# Patient Record
Sex: FEMALE | Race: WHITE
[De-identification: names, ages, dates, MRNs, and addresses within clinical notes are randomized per-mention and may not be internally consistent; named-entity substitution may affect disease eponyms.]

---

## 2019-09-17 ENCOUNTER — HOSPITAL ENCOUNTER (OUTPATIENT)
Dept: HOSPITAL 53 - M MSPAV | Age: 74
Setting detail: OBSERVATION
LOS: 2 days | Discharge: HOME | End: 2019-09-19
Attending: INTERNAL MEDICINE | Admitting: INTERNAL MEDICINE
Payer: MEDICARE

## 2019-09-17 VITALS — DIASTOLIC BLOOD PRESSURE: 58 MMHG | SYSTOLIC BLOOD PRESSURE: 115 MMHG

## 2019-09-17 VITALS — DIASTOLIC BLOOD PRESSURE: 74 MMHG | SYSTOLIC BLOOD PRESSURE: 142 MMHG

## 2019-09-17 VITALS — HEIGHT: 63 IN | BODY MASS INDEX: 34.3 KG/M2 | WEIGHT: 193.57 LBS

## 2019-09-17 VITALS — DIASTOLIC BLOOD PRESSURE: 55 MMHG | SYSTOLIC BLOOD PRESSURE: 115 MMHG

## 2019-09-17 DIAGNOSIS — E03.9: ICD-10-CM

## 2019-09-17 DIAGNOSIS — R44.1: ICD-10-CM

## 2019-09-17 DIAGNOSIS — M54.5: ICD-10-CM

## 2019-09-17 DIAGNOSIS — Z91.81: ICD-10-CM

## 2019-09-17 DIAGNOSIS — R55: Primary | ICD-10-CM

## 2019-09-17 DIAGNOSIS — J96.02: ICD-10-CM

## 2019-09-17 DIAGNOSIS — D51.9: ICD-10-CM

## 2019-09-17 DIAGNOSIS — E78.49: ICD-10-CM

## 2019-09-17 DIAGNOSIS — E87.0: ICD-10-CM

## 2019-09-17 DIAGNOSIS — Z79.82: ICD-10-CM

## 2019-09-17 DIAGNOSIS — E86.0: ICD-10-CM

## 2019-09-17 DIAGNOSIS — N17.9: ICD-10-CM

## 2019-09-17 DIAGNOSIS — E11.9: ICD-10-CM

## 2019-09-17 DIAGNOSIS — Z79.899: ICD-10-CM

## 2019-09-17 DIAGNOSIS — I10: ICD-10-CM

## 2019-09-17 DIAGNOSIS — Z79.4: ICD-10-CM

## 2019-09-17 LAB
CK MB CFR.DF SERPL CALC: 2.17
CK MB SERPL-MCNC: 2 NG/ML (ref ?–3.6)
CK SERPL-CCNC: 92 U/L (ref 26–192)
PHOSPHATE SERPL-MCNC: 4.1 MG/DL (ref 2.5–4.9)
TROPONIN I SERPL-MCNC: < 0.02 NG/ML (ref ?–0.1)
URATE SERPL-MCNC: 7.3 MG/DL (ref 2.6–6)

## 2019-09-17 PROCEDURE — 82607 VITAMIN B-12: CPT

## 2019-09-17 PROCEDURE — 83735 ASSAY OF MAGNESIUM: CPT

## 2019-09-17 PROCEDURE — 85027 COMPLETE CBC AUTOMATED: CPT

## 2019-09-17 PROCEDURE — 96372 THER/PROPH/DIAG INJ SC/IM: CPT

## 2019-09-17 PROCEDURE — 90682 RIV4 VACC RECOMBINANT DNA IM: CPT

## 2019-09-17 PROCEDURE — 84100 ASSAY OF PHOSPHORUS: CPT

## 2019-09-17 PROCEDURE — 84145 PROCALCITONIN (PCT): CPT

## 2019-09-17 PROCEDURE — 93306 TTE W/DOPPLER COMPLETE: CPT

## 2019-09-17 PROCEDURE — 84550 ASSAY OF BLOOD/URIC ACID: CPT

## 2019-09-17 PROCEDURE — 36415 COLL VENOUS BLD VENIPUNCTURE: CPT

## 2019-09-17 PROCEDURE — 96360 HYDRATION IV INFUSION INIT: CPT

## 2019-09-17 PROCEDURE — 82803 BLOOD GASES ANY COMBINATION: CPT

## 2019-09-17 PROCEDURE — 90670 PCV13 VACCINE IM: CPT

## 2019-09-17 PROCEDURE — 97161 PT EVAL LOW COMPLEX 20 MIN: CPT

## 2019-09-17 PROCEDURE — 84484 ASSAY OF TROPONIN QUANT: CPT

## 2019-09-17 PROCEDURE — 82747 ASSAY OF FOLIC ACID RBC: CPT

## 2019-09-17 PROCEDURE — 82553 CREATINE MB FRACTION: CPT

## 2019-09-17 PROCEDURE — 93005 ELECTROCARDIOGRAM TRACING: CPT

## 2019-09-17 PROCEDURE — 94010 BREATHING CAPACITY TEST: CPT

## 2019-09-17 PROCEDURE — 96361 HYDRATE IV INFUSION ADD-ON: CPT

## 2019-09-17 PROCEDURE — 80048 BASIC METABOLIC PNL TOTAL CA: CPT

## 2019-09-17 PROCEDURE — 82550 ASSAY OF CK (CPK): CPT

## 2019-09-17 RX ADMIN — INSULIN LISPRO SCH UNITS: 100 INJECTION, SOLUTION INTRAVENOUS; SUBCUTANEOUS at 17:30

## 2019-09-17 RX ADMIN — SODIUM CHLORIDE SCH MLS/HR: 9 INJECTION, SOLUTION INTRAVENOUS at 16:00

## 2019-09-17 RX ADMIN — ASPIRIN SCH MG: 81 TABLET ORAL at 20:07

## 2019-09-17 RX ADMIN — SODIUM CHLORIDE SCH UNITS: 4.5 INJECTION, SOLUTION INTRAVENOUS at 20:08

## 2019-09-17 RX ADMIN — GABAPENTIN SCH MG: 400 CAPSULE ORAL at 20:07

## 2019-09-17 RX ADMIN — INSULIN LISPRO SCH UNITS: 100 INJECTION, SOLUTION INTRAVENOUS; SUBCUTANEOUS at 21:00

## 2019-09-18 VITALS — SYSTOLIC BLOOD PRESSURE: 136 MMHG | DIASTOLIC BLOOD PRESSURE: 66 MMHG

## 2019-09-18 VITALS — SYSTOLIC BLOOD PRESSURE: 149 MMHG | DIASTOLIC BLOOD PRESSURE: 76 MMHG

## 2019-09-18 VITALS — DIASTOLIC BLOOD PRESSURE: 62 MMHG | SYSTOLIC BLOOD PRESSURE: 133 MMHG

## 2019-09-18 VITALS — SYSTOLIC BLOOD PRESSURE: 137 MMHG | DIASTOLIC BLOOD PRESSURE: 63 MMHG

## 2019-09-18 VITALS — SYSTOLIC BLOOD PRESSURE: 133 MMHG | DIASTOLIC BLOOD PRESSURE: 78 MMHG

## 2019-09-18 VITALS — DIASTOLIC BLOOD PRESSURE: 72 MMHG | SYSTOLIC BLOOD PRESSURE: 135 MMHG

## 2019-09-18 LAB
BASE EXCESS BLDA CALC-SCNC: -0.7 MMOL/L (ref -2–2)
BUN SERPL-MCNC: 24 MG/DL (ref 7–18)
CALCIUM SERPL-MCNC: 8.4 MG/DL (ref 8.8–10.2)
CHLORIDE SERPL-SCNC: 116 MEQ/L (ref 98–107)
CO2 BLDA CALC-SCNC: 26 MEQ/L (ref 23–31)
CO2 SERPL-SCNC: 25 MEQ/L (ref 21–32)
CREAT SERPL-MCNC: 1.16 MG/DL (ref 0.55–1.3)
GFR SERPL CREATININE-BSD FRML MDRD: 48.9 ML/MIN/{1.73_M2} (ref 39–?)
GLUCOSE SERPL-MCNC: 67 MG/DL (ref 70–100)
HCO3 BLDA-SCNC: 24.7 MEQ/L (ref 22–26)
HCO3 STD BLDA-SCNC: 23.8 MEQ/L (ref 22–26)
HCT VFR BLD AUTO: 37.7 % (ref 36–47)
HCT VFR BLD AUTO: 37.9 % (ref 36–47)
HGB BLD-MCNC: 12.3 G/DL (ref 12–15.5)
MAGNESIUM SERPL-MCNC: 2 MG/DL (ref 1.8–2.4)
MCH RBC QN AUTO: 31 PG (ref 27–33)
MCHC RBC AUTO-ENTMCNC: 32.6 G/DL (ref 32–36.5)
MCV RBC AUTO: 95 FL (ref 80–96)
PCO2 BLDA: 43.5 MMHG (ref 35–45)
PH BLDA: 7.37 UNITS (ref 7.35–7.45)
PLATELET # BLD AUTO: 203 10^3/UL (ref 150–450)
PO2 BLDA: 65.3 MMHG (ref 75–100)
POTASSIUM SERPL-SCNC: 3.9 MEQ/L (ref 3.5–5.1)
RBC # BLD AUTO: 3.97 10^6/UL (ref 4–5.4)
SAO2 % BLDA: 92.9 % (ref 95–99)
SODIUM SERPL-SCNC: 147 MEQ/L (ref 136–145)
VIT B12 SERPL-MCNC: 214 PG/ML (ref 247–911)
WBC # BLD AUTO: 11 10^3/UL (ref 4–10)

## 2019-09-18 RX ADMIN — INSULIN LISPRO SCH UNITS: 100 INJECTION, SOLUTION INTRAVENOUS; SUBCUTANEOUS at 20:52

## 2019-09-18 RX ADMIN — GABAPENTIN SCH MG: 400 CAPSULE ORAL at 16:01

## 2019-09-18 RX ADMIN — GABAPENTIN SCH MG: 400 CAPSULE ORAL at 08:42

## 2019-09-18 RX ADMIN — DULOXETINE HYDROCHLORIDE SCH MG: 30 CAPSULE, DELAYED RELEASE ORAL at 08:42

## 2019-09-18 RX ADMIN — GABAPENTIN SCH MG: 400 CAPSULE ORAL at 20:41

## 2019-09-18 RX ADMIN — INSULIN LISPRO SCH UNITS: 100 INJECTION, SOLUTION INTRAVENOUS; SUBCUTANEOUS at 18:20

## 2019-09-18 RX ADMIN — INSULIN LISPRO SCH UNITS: 100 INJECTION, SOLUTION INTRAVENOUS; SUBCUTANEOUS at 07:30

## 2019-09-18 RX ADMIN — LEVOTHYROXINE SODIUM SCH MCG: 75 TABLET ORAL at 05:24

## 2019-09-18 RX ADMIN — SODIUM CHLORIDE SCH UNITS: 4.5 INJECTION, SOLUTION INTRAVENOUS at 20:42

## 2019-09-18 RX ADMIN — SODIUM CHLORIDE SCH UNITS: 4.5 INJECTION, SOLUTION INTRAVENOUS at 05:24

## 2019-09-18 RX ADMIN — ASPIRIN SCH MG: 81 TABLET ORAL at 20:42

## 2019-09-18 RX ADMIN — INSULIN LISPRO SCH UNITS: 100 INJECTION, SOLUTION INTRAVENOUS; SUBCUTANEOUS at 12:06

## 2019-09-18 RX ADMIN — SODIUM CHLORIDE SCH UNITS: 4.5 INJECTION, SOLUTION INTRAVENOUS at 13:53

## 2019-09-18 RX ADMIN — SODIUM CHLORIDE SCH MLS/HR: 9 INJECTION, SOLUTION INTRAVENOUS at 00:39

## 2019-09-18 NOTE — HPE
DATE OF ADMISSION:  2019

 

PRIMARY CARE PHYSICIAN: Dr. So out of [place], [place]

 

CHIEF COMPLAINT: Altered mental status as well as falls times two today.

 

HISTORY OF PRESENT ILLNESS: Lacey is a 74-year-old woman who has a history of

insulin-dependent diabetes mellitus, type 2, hyperlipidemia, chronic pain

syndrome, for which she is on Percocet. She presents as a transfer from [facility] Emergency Room after presenting there this morning after she suffered

two falls. Patient informs me that she had gotten up out of bed and then all of a

sudden fell backward into bed. She felt like she lost consciousness for a couple

seconds. When she regained consciousness she attempted to stand up again and fell

flat onto her head, striking her head. She subsequently went to the emergency

room, as she has been feeling quite fatigued and tired for further evaluation.

While there, patient was noted to be afebrile with stable vital signs. She was

noted to be hypercapnic on her blood gas with a pCO2 of 51.4 with a pH of 7.29,

oxygen saturation of 94%, and a pO2 of 81.She was placed on oxygen with

improvement in her mentation. Further workup, including labs, found that she had

a creatinine of 1.9 with a BUN of 43 and a potassium of 3.7. Her in initial

troponin as well as EKG and thyroid studies were negative. An aspirin,

salicylate, as well as urinalysis and urine drug screen were all negative. CT

scan of the head as well as chest x-ray were all unremarkable. The patient was

transferred to our facility, as they did not have any monitored beds.

 

ALLERGIES: SOMA as well as MACRODANTIN.

 

CURRENT MEDICATIONS:

- baby aspirin daily

- duloxetine 60 mg daily

- gabapentin 700 mg three times a day

- Norco 5/325 one tablet three times a day

- insulin detemir 60 units daily

- Synthroid 75 mcg daily

- omeprazole 40 mg daily

- tizanidine 2 mg at bedtime

- triamterene/hydrochlorothiazide 37.5/25 one tablet daily

- Baclofen 10 mg three times a day

- NovoLog FlexPen 1 unit subcutaneous as directed by sliding scale

- lisinopril 20 mg by mouth daily

- Naprosyn 500 mg twice a day

- rosuvastatin 10 mg by mouth daily

 

PAST MEDICAL HISTORY:

1. Insulin-dependent diabetes mellitus, type 2.

2. Hypertension.

3. Chronic lower back pain.

4. Hyperlipidemia.

5. Anxiety and depression.

6. Hypothyroidism.

7. Gastroesophageal reflux disease (GERD).

8. Patient has a history of left wrist carpal tunnel status post carpal tunnel

release.

9. She has history of neck pain and is status post cervical spine surgery.

 

SURGICAL HISTORY:

Notable for:

1. Cholecystectomy.

2. Patient had a splenectomy done in . Reportedly for cancer; however, she

says that when they removed her spleen, they discovered that she did not have any

cancer.

3. Left carpal tunnel release.

4. She has history of hernia repair.

5. History of appendectomy.

6. History of back surgeries times three.

7. History of neck surgery times one.

 

SOCIAL HISTORY: Patient is . She is currently retired. She resides at home

with her , Shahid. He is her surrogate medical decision maker. Patient

will be a full code, as she is undecided at this time about her advance

directives. She is not a smoker. She does not use any alcohol or any illicit

drugs.

 

FAMILY HISTORY: Patient cannot recollect much of her family history. She states

that her mother  of natural causes in her 90s. She is unclear what the cause

of death for her father was.

 

REVIEW OF SYSTEMS: Twelve systems reviewed with the patient otherwise negative at

this time.

 

PHYSICAL EXAMINATION:

Patient's temperature is 98.8, pulse is 79 and regular, respirations 17, blood

pressure 115/55, oxygen saturation 95% on room air.

GENERAL: The patient is alert and oriented times three. She does have some

moments of cognitive impairment. Her skin is intact and warm to touch.

Her head is atraumatic, normocephalic. Pupils are symmetric and reactive to

light. Extraocular movements are full in all directions. She has no scleral

icterus or conjunctival injection. Oropharynx is clear without exudate, erythema,

or thrush. The tongue is midline. Her speech is fluent without any slurring. She

has no facial asymmetry.

NECK: Supple.

LUNGS: Sounds are present without rales or rhonchi.

HEART: S1, S2. No murmurs, rubs, or gallop.

ABDOMEN: Soft, protuberant, nontender, nondistended with active bowel sounds.

EXTREMITIES: Without any significant cyanosis, clubbing, or edema.

NEUROLOGIC: Cranial nerves appear to be grossly intact without any focal or

lateralizing neurologic deficits.

 

PERTINENT IMAGING STUDIES:

CT scan of the head done at [facility] as indicated to be by the emergency room

(ER) physician is negative as well as the chest x-ray.

 

EKG shows a normal sinus rhythm.

 

White count is 10.9, hemoglobin is 13.2, hematocrit 39.8, platelet count

391,000.

 

Salicylates was less than 2.8. Acetaminophen was less than 2. Alcohol level was

less than 10. PT was 10, INR 0.96, PTT was 21.

 

Sodium 144, potassium 3.7, chloride 108, bicarbonate was 26, anion gap is 9.4,

BUN is 43, creatinine is 1.9, glucose was 132, calcium 8.7. AST 14, ALT 17,

alkaline phosphatase 54. Initial troponin was less than 0.017. Lipase 197. TSH is

0.59. Lactic acid is 1. Free T4 direct is 1.

 

ABG showed a pH of 7.29, pCO2 of 51, pO2 of 81, bicarbonate of 24, and oxygen

saturations of 94%. This was done on 2.5 liters of oxygen.

 

Urinalysis was negative. Did not indicate any protein. The pH was 5. Specific

gravity was 1.025. Clarity was clear. Color was yellow. Urine drug screens were

negative.  Ammonia level was 26.

 

IMPRESSION:

1. Syncope. Patient will be admitted to an observation status for further

evaluation. At this point in time, I feel she warrants less than a 48-hour

hospital stay. She will be placed on observation status. We will obtain an

echocardiogram. She will receive neurologic checks every 4 hours. We will trend

her troponin markers. She will be monitored on telemetry.

 

2. Hypercapnia. Patient may have some underlying obstructive sleep apnea, which

may be causing her hypercapnia. We will repeat ABG in the morning after her

sleeping overnight. She likely will need an outpatient sleep study. We will check

pulmonary function tests (PFTs) at this time. I am concerned that her narcotic

medications could be sort of worsening this; however, she states that she has not

taken any since filling her prescription, and this evident in her urine drug

screen, which is negative.

 

3. Insulin-dependent diabetes mellitus, type 2. She will be continued on her

sliding scale as well as Levemir. We will check her blood sugars before meals and

at bedtime.

 

4. Nonoliguric renal failure. Unclear if this represents chronic versus acute

state. We will have to obtain outpatient labs from Dr. So's office to see what

her baseline creatinine is. We will check phosphorus, uric acid, as well as FENa.

For now, patient's Naprosyn, lisinopril, and Diazide will be held. She will be

hydrated with normal saline at 100 mL an hour.

 

5. Chronic pain syndrome. Patient can be resumed on her Percocets. We will repeat

an ABG in the morning.

 

6. History of splenomegaly. Will check a procalcitonin in the morning. Patient

had blood cultures times two done at outside facility. I will contact them

tomorrow to determine whether it has turned positive or not. She did receive one

dose of Zosyn prior to transfer. At this point in time, I do not feel that there

is any indication for antibiotics and will not place her on any.

 

7. For deep vein thrombosis (DVT) prophylaxis, patient will be placed on heparin

subcutaneous due to her renal dysfunction. Code status will be full code.

## 2019-09-18 NOTE — IPNPDOC
Subjective


Date Seen


The patient was seen on 9/18/19.





Subjective


Chief Complaint/HPI


Resting in bed.  Claims to have slept good last night.  Having visual 

hallucinations, denies auditory hallucinations or threat to self.  Told me about

a recent bombing in [place] that killed [id number] people ( I could not find any 

information to corroborate this)  No further syncopal events.





Objective


Physical Examination


General Exam:  Positive: Alert, Cooperative, No Acute Distress


Eye Exam:  Positive: PERRLA, Conjunctiva & lids normal


ENT Exam:  Positive: Atraumatic, Mucous membr. moist/pink, Pharynx Normal


Neck Exam:  Positive: Supple; 


   Negative: JVD, thyromegaly


Chest Exam:  Positive: Clear to auscultation, Normal air movement


Heart Exam:  Positive: Rate Normal


Telemetry:  Positive: No significant arrhythmia, Sinus


Abdomen Exam:  Positive: Normal bowel sounds, Soft; 


   Negative: Tenderness, Hepatospenomegaly


Extremity Exam:  Positive: Normal pulses; 


   Negative: Clubbing, Cyanosis, Edema, Tenderness, Swelling, Other


Skin Exam:  Positive: Nl turgor and temperature; 


   Negative: Rash


Psych Exam:  Positive: Oriented x 3 (person, place and event); 


   Negative: Anxiety





Assessment /Plan


Assessment


# Syncope


- suspect likely due orthostatic hypotension associated with acute dehydration 

and ongoing use of antihypertensives


- IVFs stopped this am for hypernatremia


- ECHO pending


- No reported arrthymias on telemetry 


- serial trops x 2 negative





# Acute hypernatremia


- stop IVFs, repeat BMP in am





# IDDM type 2


- hypogylcemic this am on labs


- reduce lantus to 25 units





# Acute dehydration with BRO


- resolved





# Visual hallucinatons


- may represent subacte delirium associated with metabolic encephalopathy ( BRO,

Hypercapnea) vs. narcotic withdrawal


- check b12, folate and Mg levels


- Consider MRI brain and psych consult if not improving





# HTN


- BP relatively stable off meds


- continue to monitor





# DVT prophylaxis: loveonx + SCDs





Dispo: Awaiting further medical workup prior to discharge in 1-2 days.   

updated by phone by me.





Plan/VTE


VTE Prophylaxis Ordered?:  Yes





VS, I&O, 24H, Fishbone


Vital Signs/I&O





Vital Signs








  Date Time  Temp Pulse Resp B/P (MAP) Pulse Ox O2 Delivery O2 Flow Rate FiO2


 


9/18/19 10:00 97.0 70 18 133/62 (85 94   














I&O- Last 24 Hours up to 6 AM 


 


 9/18/19





 06:00


 


Intake Total 1400 ml


 


Output Total 250 ml


 


Balance 1150 ml











Laboratory Data


24H LABS


Laboratory Tests 2


9/17/19 15:43: 


Uric Acid 7.3H, Phosphorus Level 4.1, Total Creatine Kinase 92, Creatine Kinase 

MB 2.0, Creatine Kinase MB Relative Index 2.17, Troponin I < 0.02


9/17/19 17:13: Bedside Glucose (Misc Panel) 100


9/17/19 20:22: Bedside Glucose (Misc Panel) 177H


9/17/19 22:28: Bedside Glucose (Misc Panel) 173H


9/18/19 05:56: 


Blood Gas Bicarbonate Standard 23.8, Arterial Blood pH 7.372, Arterial Blood 

Partial Pressure CO2 43.5, Arterial Blood Partial Pressure O2 65.3L, Arterial 

Blood Total CO2 26.0, Arterial Blood HCO3 24.7, Arterial Blood Base Excess -0.7,

Arterial Blood Oxygen Saturation 92.9L


9/18/19 06:27: 


Nucleated Red Blood Cells % (auto) 0.0, Anion Gap 6L, Glomerular Filtration Rate

48.9, Blood Urea Nitrogen 24H, Creatinine 1.16, Sodium Level 147H, Potassium 

Level 3.9, Chloride Level 116H, Carbon Dioxide Level 25, Calcium Level 8.4L


9/18/19 11:56: Bedside Glucose (Misc Panel) 136H


CBC/BMP


Laboratory Tests


9/18/19 06:27








Red Blood Count 3.97 L, Mean Corpuscular Volume 95.0, Mean Corpuscular Hem

oglobin 31.0, Mean Corpuscular Hemoglobin Concent 32.6, Red Cell Distribution 

Width 15.8 H, Calcium Level 8.4 L











ENOCH TRAN MD             Sep 18, 2019 14:42

## 2019-09-18 NOTE — PFTRPT
Height: 63.00  Inches  Weight: 193.00  Lbs  BSA: 1.90

Diagnosis: SOB



DATE OF PROCEDURE:  09/18/2019 



ORDERED BY:  Mamadou Ulrich MD 



Spirometry:  Excellent technical quality.  Forced vital capacity normal.  FEV1 
in proportion.  Obstructive index is, therefore, normal.



Flow Volume Loop:  Expiratory limb of the flow volume loop is normal.



IMPRESSION:  Normal study.

MTDD

## 2019-09-19 VITALS — SYSTOLIC BLOOD PRESSURE: 147 MMHG | DIASTOLIC BLOOD PRESSURE: 76 MMHG

## 2019-09-19 VITALS — DIASTOLIC BLOOD PRESSURE: 80 MMHG | SYSTOLIC BLOOD PRESSURE: 135 MMHG

## 2019-09-19 VITALS — DIASTOLIC BLOOD PRESSURE: 77 MMHG | SYSTOLIC BLOOD PRESSURE: 135 MMHG

## 2019-09-19 VITALS — SYSTOLIC BLOOD PRESSURE: 131 MMHG | DIASTOLIC BLOOD PRESSURE: 74 MMHG

## 2019-09-19 LAB
BUN SERPL-MCNC: 18 MG/DL (ref 7–18)
CALCIUM SERPL-MCNC: 9.2 MG/DL (ref 8.8–10.2)
CHLORIDE SERPL-SCNC: 109 MEQ/L (ref 98–107)
CO2 SERPL-SCNC: 24 MEQ/L (ref 21–32)
CREAT SERPL-MCNC: 1.11 MG/DL (ref 0.55–1.3)
GFR SERPL CREATININE-BSD FRML MDRD: 51.4 ML/MIN/{1.73_M2} (ref 39–?)
GLUCOSE SERPL-MCNC: 123 MG/DL (ref 70–100)
HCT VFR BLD AUTO: 40 % (ref 36–47)
HGB BLD-MCNC: 13.5 G/DL (ref 12–15.5)
MCH RBC QN AUTO: 30.7 PG (ref 27–33)
MCHC RBC AUTO-ENTMCNC: 33.8 G/DL (ref 32–36.5)
MCV RBC AUTO: 90.9 FL (ref 80–96)
PLATELET # BLD AUTO: 374 10^3/UL (ref 150–450)
POTASSIUM SERPL-SCNC: 3.8 MEQ/L (ref 3.5–5.1)
RBC # BLD AUTO: 4.4 10^6/UL (ref 4–5.4)
SODIUM SERPL-SCNC: 142 MEQ/L (ref 136–145)
WBC # BLD AUTO: 12.1 10^3/UL (ref 4–10)

## 2019-09-19 RX ADMIN — INSULIN LISPRO SCH UNITS: 100 INJECTION, SOLUTION INTRAVENOUS; SUBCUTANEOUS at 08:10

## 2019-09-19 RX ADMIN — INSULIN LISPRO SCH UNITS: 100 INJECTION, SOLUTION INTRAVENOUS; SUBCUTANEOUS at 12:26

## 2019-09-19 RX ADMIN — SODIUM CHLORIDE SCH UNITS: 4.5 INJECTION, SOLUTION INTRAVENOUS at 05:35

## 2019-09-19 RX ADMIN — DULOXETINE HYDROCHLORIDE SCH MG: 30 CAPSULE, DELAYED RELEASE ORAL at 08:11

## 2019-09-19 RX ADMIN — GABAPENTIN SCH MG: 400 CAPSULE ORAL at 08:11

## 2019-09-19 RX ADMIN — SODIUM CHLORIDE SCH UNITS: 4.5 INJECTION, SOLUTION INTRAVENOUS at 14:07

## 2019-09-19 RX ADMIN — LEVOTHYROXINE SODIUM SCH MCG: 75 TABLET ORAL at 05:35

## 2019-09-19 RX ADMIN — GABAPENTIN SCH MG: 400 CAPSULE ORAL at 15:19

## 2019-09-19 NOTE — IPNPDOC
Subjective


Date Seen


The patient was seen on 9/19/19.





Subjective


Chief Complaint/HPI


Having less visual disturbances today. A + O x 3.  No recurrent syncope.  Did 

well with PT





Objective


Physical Examination


General Exam:  Positive: Alert, Cooperative, No Acute Distress


Eye Exam:  Positive: PERRLA, Conjunctiva & lids normal


ENT Exam:  Positive: Atraumatic, Mucous membr. moist/pink, Pharynx Normal


Neck Exam:  Positive: Supple; 


   Negative: JVD, thyromegaly


Chest Exam:  Positive: Clear to auscultation, Normal air movement


Heart Exam:  Positive: Rate Normal


Telemetry:  Positive: No significant arrhythmia, Sinus


Abdomen Exam:  Positive: Normal bowel sounds, Soft; 


   Negative: Tenderness, Hepatospenomegaly


Extremity Exam:  Positive: Normal pulses; 


   Negative: Clubbing, Cyanosis, Edema, Tenderness, Swelling, Other


Skin Exam:  Positive: Nl turgor and temperature; 


   Negative: Rash


Neuro Exam:  Positive: Normal Gait, Normal Speech, Strength at 5/5 X4 ext, 

Normal Tone


Psych Exam:  Positive: Oriented x 3 (person, place and event); 


   Negative: Anxiety





Assessment /Plan


Assessment


# Syncope


- suspect likely due orthostatic hypotension associated with acute dehydration 

and ongoing use of antihypertensives


- ECHO nl


- No reported arrthymias on telemetry 


- serial trops x 2 negative


- recommend no driving for at least 6 months, patient advised


- f/u with PCP in 1-2 weeks





# Hypercapnia


- appears to be a one time episode


- recommend PFTs as outpatient


- consider sleep study also as outpatient





# Acute hypernatremia


-BMP reviewed this am


- resolved





# IDDM type 2


- eugylcemic this am on labs


- reduced lantus to 25 units, can likely resume home dose of 30 units at 

discharge if diet picks up





# Acute dehydration with BRO


- resolved





# Visual hallucinatons


- may represent subacte delirium associated with metabolic encephalopathy ( BRO,

Hypercapnea) vs. narcotic withdrawal, less likely.  


- UA and UDS are negative at transferring facility, procalcitonin is normal


- b12 deficiency found this admission, started on B12 replacement, this may also

be contributing to the visual hallucinations


- folate and Mg levels are normal





# HTN


- BP relatively stable off meds


- continue to monitor





# DVT prophylaxis: loveonx + SCDs





Dispo: Home today.   updated by phone by me.





Plan/VTE


VTE Prophylaxis Ordered?:  Yes





VS, I&O, 24H, Fishbone


Vital Signs/I&O





Vital Signs








  Date Time  Temp Pulse Resp B/P (MAP) Pulse Ox O2 Delivery O2 Flow Rate FiO2


 


9/19/19 06:00 97.1 76 18 135/80 (98) 92   














I&O- Last 24 Hours up to 6 AM 


 


 9/19/19





 06:00


 


Intake Total 1920 ml


 


Balance 1920 ml











Laboratory Data


24H LABS


Laboratory Tests 2


9/18/19 11:56: Bedside Glucose (Misc Panel) 136H


9/18/19 15:16: 


Magnesium Level 2.0, Vitamin B12 Level 214L


9/18/19 17:04: Bedside Glucose (Misc Panel) 111H


9/18/19 20:30: Bedside Glucose (Misc Panel) 131H


9/19/19 06:11: 


Nucleated Red Blood Cells % (auto) 0.0, Anion Gap 9, Glomerular Filtration Rate 

51.4, Blood Urea Nitrogen 18, Creatinine 1.11, Sodium Level 142, Potassium Level

3.8, Chloride Level 109H, Carbon Dioxide Level 24, Calcium Level 9.2


CBC/BMP


Laboratory Tests


9/18/19 15:16








9/19/19 06:11








Red Blood Count 4.40, Mean Corpuscular Volume 90.9, Mean Corpuscular Hemoglobin 

30.7, Mean Corpuscular Hemoglobin Concent 33.8, Red Cell Distribution Width 15.1

H, Calcium Level 9.2











ENOCH TRAN MD             Sep 19, 2019 12:01

## 2019-09-19 NOTE — ECHO
DATE OF STUDY:  09/18/2019

REFERRING PHYSICIAN:  Mamadou Ulrich MD

INDICATION:  Syncope.

HEIGHT:  160 cm.

WEIGHT:  88 kg.

 

2-D MEASUREMENTS:

Aortic root:  3.8 cm at the level of the sinus of Valsalva

Left atrium:  3.6 cm

Ventricular septum:  1.12 cm

Posterior wall:  1.19 cm

Left ventricle diastole:  4.2 cm

Inferior vena cava:  2.1 cm with more than 50% respiratory variation

Aortic annulus:  2.3 cm

 

DOPPLER MEASUREMENTS:

Aortic valve velocity:  137 cm/sec

LVOT velocity:  100 cm/sec

LVOT VTI:  21.7 cm

Mild mitral regurgitation

Mitral E velocity:  99.0 cm/sec

Mitral A velocity:  99.0 cm/sec

Mitral deceleration time:  173 ms

Trace tricuspid regurgitation

Very mild pulmonic regurgitation

 

MITRAL ANNULAR TISSUE DOPPLER:

E prime septal:  8.4 cm/sec

 

DESCRIPTION:  The rhythm was sinus.  Image quality was fair.  This was a 2-D,

M-mode, color flow Doppler and pulse wave Doppler examination and included mitral

annular tissue Doppler.

 

CONCLUSIONS:

1.  Normal left ventricle internal dimensions and wall thickness.  Normal LV wall

thickness.  Normal LV systolic function.  LVEF 65-70% by visual estimate.  Normal

LV diastolic function.  Normal right ventricle size and systolic function.

2.  No pericardial effusion.

3.  Mild aortic valve sclerosis of a 3-cuspid aortic valve.

4.  Mild dilatation of the aortic root at the level of the sinus of Valsalva.

5.  Suggestive of normal central venous pressure (5-10 mmHg).

## 2019-09-20 LAB — FOLATE RBC-MCNC: 592 NG/ML (ref 280–791)

## 2023-04-27 NOTE — ECGEPIP
[facility]

                                       

                                       Test Date:    2019

Pat Name:     BRYON ALEGRIA           Department:   

Patient ID:   [id number]                 Room:         -71

Gender:       Female                   Technician:   SANTIAGO

:          1945               Requested By: JOI MATHIAS 

Order Number: [id number]     Reading MD:   John William

                                 Measurements

Intervals                              Axis          

Rate:         69                       P:            -10

VT:           176                      QRS:          -22

QRSD:         100                      T:            52

QT:           398                                    

QTc:          428                                    

                           Interpretive Statements

SINUS RHYTHM

Comparison tracing not on file

Electronically Signed on 2019 14:50:02 EDT by John William Winlevi Counseling:  I discussed with the patient the risks of topical clascoterone including but not limited to erythema, scaling, itching, and stinging. Patient voiced their understanding.

## 2023-08-16 NOTE — DSES
DATE OF ADMISSION:   09/17/2019

DATE OF DISCHARGE:  09/19/2019

 

DISCHARGE DIAGNOSES:

1.  Syncope possibly secondary to orthostatic hypotension associated with

dehydration.

2.  Hypercapnia, transient.

3.  Acute hypernatremia, resolved.

4.  Insulin-dependent diabetes mellitus type 2.

5.  Acute dehydration with acute kidney injury.

6.  Visual hallucinations, possibly secondary to vitamin B12 deficiency.

7.  Hypertension.

8.  Acute B12 deficiency, newly diagnosed this hospitalization.

 

PROCEDURES PERFORMED DURING HOSPITALIZATION:  None.

 

CONSULTANTS:  None.

 

DISPOSITION:  The patient is discharged home.

 

DISCHARGE INSTRUCTIONS:  The patient is instructed to take B12 at 1000 mg daily

and to followup with primary care provider in approximately 1 to 2 weeks' time.

She has also been instructed not to drive given her syncopal episode for the next

6 months.

 

CONDITION ON DISCHARGE:   Stable.

 

CONDITION:  Appropriate for discharge.

 

RELEVANT IMAGING STUDIES:

Echocardiogram with Doppler showed the patient had a normal preserved left

ventricular ejection fraction with no evidence of any valvular heart disease or

pericardial effusion or cardiac emboli.  Please reference full report for

details.

 

CT of the head without contrast at [facility] emergency room was

negative.

 

Chest x-ray done at [facility] was negative.

 

Electrocardiogram (EKG) showed a normal sinus rhythm.

 

RELEVANT LABORATORIES:  Done at our institution:

The patient had a white count of 11 with a hemoglobin of 12, hematocrit 37,

platelet count of 203.

 

ABG done at our institution showed that she had a pH of 7.3, pCO2 of 43,

bicarbonate 24, pO2 of 65.3, O2 saturation of 92.9.

 

Uric acid 7.3, phosphorous 4.1, troponin markers times two were negative.

 

Sodium 142, potassium 3.8, chloride 109, bicarbonate 24, BUN 18, creatinine 1.11.

On admission to [facility] emergency room it was 2.0.  Glucose 123,

calcium 9.2, magnesium 2.0, vitamin B12 was 214.  RBC and folate still pending at

discharge.  Procalcitonin was 0.02.

 

DISCHARGE MEDICATIONS:

- vitamin B12 at 1000 mcg by mouth daily

- baby aspirin daily

- Baclofen 10 mg three times a day as needed for spasm

- Trulicity 0.75 mg weekly

- duloxetine 60 mg at bedtime and 30 mg in the morning

- gabapentin 400 mg by mouth three times a day

- Norco 5/325 mg one tablet every 8 hours as needed for pain

- Levemir 30 units at bedtime

- Synthroid 75 mcg daily

- Lisinopril 20 mg at bedtime

- omeprazole 20 mg twice a day

- lovastatin 10 mg at bedtime

- Diazide 37.5/25 one capsule daily

 

The patient was administered her flu vaccination at the time of discharge on

09/19/2019.  In addition, she received Prevnar at the time of discharge.

 

HOSPITAL COURSE:  Mrs. Ivory is a 74-year-old woman who has a history of

insulin-dependent diabetes mellitus with chronic back pain, for which she is on

Norco, as well as muscle relaxants.  The patient has a history of hypertension

and had presented to [facility] emergency room with altered mental

status.  At that facility, the patient was found to be acutely dehydrated with a

creatinine of 2.0.  Initially, she had mild hypercapnia with a pCO2 in the mid

50s.  Her chest x-ray, as well as CT scan, EKG and initial troponin were negative

at the time, as well as her urinalysis and urine drug screen.  Her TSH levels

were stable.  She required minimal amounts of oxygen.  They requested transfer to

our institution as she had a presumed syncopal event at her home from her

testimony and was requiring a telemetry bed, which they did not have at that

facility.  She was admitted to our facility on observation status.  Telemetry and

troponin markers were obtained, which were negative.  Echocardiogram was

obtained, which was found to be normal.  The patient's acute kidney injury was

corrected with IV fluids.  The patient was noted to have visual hallucinations.

She was not noted to have any form of infection with urinalysis, chest x-ray, and

CT of the head done at the outside facility were negative.  The patient had not

been over ingesting her _______________ She had some transient hypercapnia and it

is unclear whether she presents with underlying sleep apnea.  I did repeat her

ABG here in the morning, her initial CO2 levels were normal.  The patient was

found to have B12 deficiency and it was felt that this was causing some of her

neuropsychiatric symptoms associated with the visual hallucinations.  She

steadily improved now that she has received more sleep during her hospital stay.

From my standpoint, she can be discharged home in stable condition.  I have

advised her not to drive for the next 6 months given the fact that she may

experience a syncopal event.  She should followup with her primary care provider

for further workup as an outpatient.

 

Total of 30 minutes was spent with this discharge. Pt's picc not giving blood return. Dr. Bello paged waiting to for further orders.